# Patient Record
Sex: FEMALE | Race: BLACK OR AFRICAN AMERICAN | NOT HISPANIC OR LATINO | URBAN - METROPOLITAN AREA
[De-identification: names, ages, dates, MRNs, and addresses within clinical notes are randomized per-mention and may not be internally consistent; named-entity substitution may affect disease eponyms.]

---

## 2017-04-03 ENCOUNTER — OFFICE (OUTPATIENT)
Dept: URBAN - METROPOLITAN AREA CLINIC 12 | Facility: CLINIC | Age: 72
End: 2017-04-03
Payer: MEDICARE

## 2017-04-03 VITALS
DIASTOLIC BLOOD PRESSURE: 87 MMHG | HEART RATE: 85 BPM | HEIGHT: 64 IN | SYSTOLIC BLOOD PRESSURE: 137 MMHG | WEIGHT: 108 LBS

## 2017-04-03 DIAGNOSIS — K86.89 OTHER SPECIFIED DISEASES OF PANCREAS: ICD-10-CM

## 2017-04-03 PROCEDURE — G8427 DOCREV CUR MEDS BY ELIG CLIN: HCPCS | Performed by: INTERNAL MEDICINE

## 2017-04-03 PROCEDURE — 99202 OFFICE O/P NEW SF 15 MIN: CPT | Performed by: INTERNAL MEDICINE

## 2017-05-15 ENCOUNTER — OFFICE (OUTPATIENT)
Dept: URBAN - METROPOLITAN AREA CLINIC 12 | Facility: CLINIC | Age: 72
End: 2017-05-15
Payer: MEDICARE

## 2017-05-15 VITALS
HEIGHT: 64 IN | DIASTOLIC BLOOD PRESSURE: 89 MMHG | WEIGHT: 109 LBS | SYSTOLIC BLOOD PRESSURE: 141 MMHG | HEART RATE: 96 BPM

## 2017-05-15 DIAGNOSIS — K86.89 OTHER SPECIFIED DISEASES OF PANCREAS: ICD-10-CM

## 2017-05-15 PROCEDURE — 99213 OFFICE O/P EST LOW 20 MIN: CPT | Performed by: INTERNAL MEDICINE

## 2017-05-15 PROCEDURE — G8427 DOCREV CUR MEDS BY ELIG CLIN: HCPCS | Performed by: INTERNAL MEDICINE

## 2017-05-15 NOTE — SERVICENOTES
Pt has a friend who had a whipple who takes something from diarrhea- pt doesn't remember what it is..she will update me at next visit

## 2017-05-15 NOTE — SERVICEHPINOTES
Pt known to me from last visit. On lower dose of creon, pt did not find any relief. The higher dose caused mid abdominal discomfort, and pt felt the stool turned flaky instead of formed. Pt feels lomotil keeps her stool formed, and she takes it first thing in morning and then again around noon. Pt has been on lomotil for several years. Last colonoscopy was 4 yrs pior by Dr. Gr. She is no longer taking any Creon. No other complaints to report.

## 2017-08-07 ENCOUNTER — OFFICE (OUTPATIENT)
Dept: URBAN - METROPOLITAN AREA CLINIC 12 | Facility: CLINIC | Age: 72
End: 2017-08-07
Payer: MEDICARE

## 2017-08-07 VITALS
WEIGHT: 105 LBS | HEIGHT: 64 IN | HEART RATE: 90 BPM | SYSTOLIC BLOOD PRESSURE: 145 MMHG | DIASTOLIC BLOOD PRESSURE: 91 MMHG

## 2017-08-07 DIAGNOSIS — K86.89 OTHER SPECIFIED DISEASES OF PANCREAS: ICD-10-CM

## 2017-08-07 PROCEDURE — G8427 DOCREV CUR MEDS BY ELIG CLIN: HCPCS | Performed by: INTERNAL MEDICINE

## 2017-08-07 PROCEDURE — 36415 COLL VENOUS BLD VENIPUNCTURE: CPT | Performed by: INTERNAL MEDICINE

## 2017-08-07 PROCEDURE — 99213 OFFICE O/P EST LOW 20 MIN: CPT | Performed by: INTERNAL MEDICINE

## 2017-08-07 NOTE — SERVICEHPINOTES
Pt is a 71 yo AA woman following with me for chronic loose stools with occasional fat/oil droplets related to what I suspect is pancreatic insufficiency secondary to Whipple many years prior for benign pancreatic tumor/recurrent pancreatitis, followed by a second surgery some months later for what sounds like a pseudocyst, at which time an unspecified amount of small bowel was also resected. The operative reports are not available to me as the surgery was over 20 yrs ago and attempt at records retrieval has not been successful. She has been on longterm lomotil (pt takes 2-3 times/day as needed), and I have trialed her on creon and more recently zenpep, but she did not feel either off these worked well for her. Pt recently misplaced her lomotil and did not have any for a week she felt miserable without it. Pt normally has loose stools 3-4 x/day, and without the lomotil, she had go to the bathroom everytime she ate. Pt does admit to eating whatever she would like.  - cold milk and veggies are triggers for her that certainly cause diarrhea, but patient chooses to consume these foods anyway. She also does not adhere to a low fat diet. She maintains a good appetite. Pt mentions some microscopic hematuria for which she is undergoing workup, and needs to see a urologist shortly. Pt also has some itching from hemorrhoids. She has had them since a child, has used suppositories in the past with good relief. No bleeding. Pt's last colonoscopy reports are not available to me, and she does not recall exactly who has done her last one, but thinks it could be Dr. Carrion or Dr. Gr (done within the last 5 years).

## 2017-08-08 LAB — THYROID STIMULATING HORMONE: TSH: 1.73 MIU/L (ref 0.3–4.2)

## 2018-09-10 ENCOUNTER — OFFICE (OUTPATIENT)
Dept: URBAN - METROPOLITAN AREA CLINIC 12 | Facility: CLINIC | Age: 73
End: 2018-09-10

## 2018-09-10 VITALS
HEIGHT: 64 IN | SYSTOLIC BLOOD PRESSURE: 171 MMHG | WEIGHT: 99 LBS | DIASTOLIC BLOOD PRESSURE: 106 MMHG | HEART RATE: 82 BPM

## 2018-09-10 DIAGNOSIS — K86.1 OTHER CHRONIC PANCREATITIS: ICD-10-CM

## 2018-09-10 DIAGNOSIS — K86.89 OTHER SPECIFIED DISEASES OF PANCREAS: ICD-10-CM

## 2018-09-10 PROCEDURE — 99213 OFFICE O/P EST LOW 20 MIN: CPT | Performed by: INTERNAL MEDICINE

## 2019-02-25 ENCOUNTER — OFFICE (OUTPATIENT)
Dept: URBAN - METROPOLITAN AREA CLINIC 12 | Facility: CLINIC | Age: 74
End: 2019-02-25

## 2019-02-25 VITALS
SYSTOLIC BLOOD PRESSURE: 140 MMHG | DIASTOLIC BLOOD PRESSURE: 80 MMHG | WEIGHT: 102 LBS | HEIGHT: 64 IN | HEART RATE: 104 BPM

## 2019-02-25 DIAGNOSIS — K86.1 OTHER CHRONIC PANCREATITIS: ICD-10-CM

## 2019-02-25 PROCEDURE — 99212 OFFICE O/P EST SF 10 MIN: CPT | Performed by: INTERNAL MEDICINE
